# Patient Record
Sex: MALE | Race: WHITE
[De-identification: names, ages, dates, MRNs, and addresses within clinical notes are randomized per-mention and may not be internally consistent; named-entity substitution may affect disease eponyms.]

---

## 2022-04-11 ENCOUNTER — TRANSCRIPTION ENCOUNTER (OUTPATIENT)
Age: 27
End: 2022-04-11

## 2022-04-11 ENCOUNTER — APPOINTMENT (OUTPATIENT)
Dept: INTERNAL MEDICINE | Facility: CLINIC | Age: 27
End: 2022-04-11
Payer: COMMERCIAL

## 2022-04-11 VITALS
OXYGEN SATURATION: 98 % | DIASTOLIC BLOOD PRESSURE: 70 MMHG | SYSTOLIC BLOOD PRESSURE: 150 MMHG | HEIGHT: 71 IN | HEART RATE: 71 BPM | BODY MASS INDEX: 40.6 KG/M2 | WEIGHT: 290 LBS

## 2022-04-11 VITALS — DIASTOLIC BLOOD PRESSURE: 80 MMHG | SYSTOLIC BLOOD PRESSURE: 138 MMHG

## 2022-04-11 DIAGNOSIS — E66.9 OBESITY, UNSPECIFIED: ICD-10-CM

## 2022-04-11 DIAGNOSIS — Z00.00 ENCOUNTER FOR GENERAL ADULT MEDICAL EXAMINATION W/OUT ABNORMAL FINDINGS: ICD-10-CM

## 2022-04-11 DIAGNOSIS — S32.009A UNSPECIFIED FRACTURE OF UNSPECIFIED LUMBAR VERTEBRA, INITIAL ENCOUNTER FOR CLOSED FRACTURE: ICD-10-CM

## 2022-04-11 DIAGNOSIS — Z78.9 OTHER SPECIFIED HEALTH STATUS: ICD-10-CM

## 2022-04-11 DIAGNOSIS — Z82.49 FAMILY HISTORY OF ISCHEMIC HEART DISEASE AND OTHER DISEASES OF THE CIRCULATORY SYSTEM: ICD-10-CM

## 2022-04-11 DIAGNOSIS — R74.8 ABNORMAL LEVELS OF OTHER SERUM ENZYMES: ICD-10-CM

## 2022-04-11 PROCEDURE — 99385 PREV VISIT NEW AGE 18-39: CPT

## 2022-04-11 PROCEDURE — G0444 DEPRESSION SCREEN ANNUAL: CPT | Mod: 59

## 2022-04-11 PROCEDURE — G0442 ANNUAL ALCOHOL SCREEN 15 MIN: CPT

## 2022-04-11 NOTE — HISTORY OF PRESENT ILLNESS
[FreeTextEntry1] : Visit to establish care with new primary care doctor\par  [de-identified] : 26M presents for visit to establish care with new PMD. \par \par PMH: hx of vertebral fracture in high school - spondylolisthesis. did PT which improved pain. once in a while gets lower back pain and di PT again last month and was told everything was ok. Hx of elevated liver enzymes - had seen GI and was given a letter that he was cleared for work. Previously could not take accutane because of elevated liver enzymes. Pt cannot remember if he had US liver - might have done it. Fasting today. \par PSH: wisdom teeth\par FH: parents have HTN, no FH of colon cancer\par SH: see below\par \par Medications: none\par Allergies:none\par

## 2022-04-11 NOTE — HEALTH RISK ASSESSMENT
[Good] : ~his/her~  mood as  good [Never] : Never [Yes] : Yes [2 - 4 times a month (2 pts)] : 2-4 times a month (2 points) [1 or 2 (0 pts)] : 1 or 2 (0 points) [Never (0 pts)] : Never (0 points) [No] : In the past 12 months have you used drugs other than those required for medical reasons? No [0] : 2) Feeling down, depressed, or hopeless: Not at all (0) [PHQ-2 Negative - No further assessment needed] : PHQ-2 Negative - No further assessment needed [With Significant Other] : lives with significant other [] :  [Sexually Active] : sexually active [Feels Safe at Home] : Feels safe at home [Fully functional (bathing, dressing, toileting, transferring, walking, feeding)] : Fully functional (bathing, dressing, toileting, transferring, walking, feeding) [Fully functional (using the telephone, shopping, preparing meals, housekeeping, doing laundry, using] : Fully functional and needs no help or supervision to perform IADLs (using the telephone, shopping, preparing meals, housekeeping, doing laundry, using transportation, managing medications and managing finances) [Smoke Detector] : smoke detector [Carbon Monoxide Detector] : carbon monoxide detector [Seat Belt] :  uses seat belt [Sunscreen] : uses sunscreen [de-identified] : [employer] - combination cardio and bodyweight exercise. lost 50 lbs [Audit-CScore] : 2 [de-identified] : healthy diet now that he is working out - eats lots of vegetables.  [XXG2Ssest] : 0 [High Risk Behavior] : no high risk behavior [Reports changes in hearing] : Reports no changes in hearing [Reports changes in vision] : Reports no changes in vision [Guns at Home] : no guns at home [Reports changes in dental health] : Reports no changes in dental health [FreeTextEntry2] : travels a lot for work. works on [employer] [de-identified] : just went to dentist recently

## 2022-04-11 NOTE — ASSESSMENT
[FreeTextEntry1] : 26M presents for visit to establish care with new PMD. \par \par 1. HCM\par -hiv/hcv screening\par -tdap - will check records - had to get shots for work. \par -flu vaccine - declined\par -covid vaccine  - J&J and moderna booster. \par \par 2. Hx of liver enzyme elevated with neg w/u\par -does not remember having  US - consider completing if still elevated\par -pt fasting for labs today\par -discussed relationship between weight/fatty liver dz and pt already losing weight\par \par rtc pending labs\par \par BP elevated however pt had BP checked for employment physicals and life insurance with normal BP after some time - likely white coat HTN .

## 2022-04-11 NOTE — COUNSELING
[Good understanding] : Patient has a good understanding of disease, goals and obesity follow-up plan [FreeTextEntry2] : pt already losing weight with self made changes in diet/exercise

## 2022-04-12 ENCOUNTER — TRANSCRIPTION ENCOUNTER (OUTPATIENT)
Age: 27
End: 2022-04-12

## 2022-04-12 LAB
ALBUMIN SERPL ELPH-MCNC: 4.8 G/DL
ALP BLD-CCNC: 82 U/L
ALT SERPL-CCNC: 44 U/L
ANION GAP SERPL CALC-SCNC: 12 MMOL/L
AST SERPL-CCNC: 30 U/L
BASOPHILS # BLD AUTO: 0.05 K/UL
BASOPHILS NFR BLD AUTO: 0.8 %
BILIRUB SERPL-MCNC: 0.8 MG/DL
BUN SERPL-MCNC: 16 MG/DL
CALCIUM SERPL-MCNC: 9.7 MG/DL
CHLORIDE SERPL-SCNC: 104 MMOL/L
CHOLEST SERPL-MCNC: 177 MG/DL
CO2 SERPL-SCNC: 24 MMOL/L
CREAT SERPL-MCNC: 0.96 MG/DL
EGFR: 112 ML/MIN/1.73M2
EOSINOPHIL # BLD AUTO: 0.21 K/UL
EOSINOPHIL NFR BLD AUTO: 3.4 %
ESTIMATED AVERAGE GLUCOSE: 97 MG/DL
GLUCOSE SERPL-MCNC: 105 MG/DL
HBA1C MFR BLD HPLC: 5 %
HCT VFR BLD CALC: 47.3 %
HCV AB SER QL: NONREACTIVE
HCV S/CO RATIO: 0.08 S/CO
HDLC SERPL-MCNC: 40 MG/DL
HGB BLD-MCNC: 16 G/DL
HIV1+2 AB SPEC QL IA.RAPID: NONREACTIVE
IMM GRANULOCYTES NFR BLD AUTO: 0.6 %
LDLC SERPL CALC-MCNC: 113 MG/DL
LYMPHOCYTES # BLD AUTO: 1.68 K/UL
LYMPHOCYTES NFR BLD AUTO: 26.9 %
MAN DIFF?: NORMAL
MCHC RBC-ENTMCNC: 31.1 PG
MCHC RBC-ENTMCNC: 33.8 GM/DL
MCV RBC AUTO: 92 FL
MONOCYTES # BLD AUTO: 0.44 K/UL
MONOCYTES NFR BLD AUTO: 7 %
NEUTROPHILS # BLD AUTO: 3.83 K/UL
NEUTROPHILS NFR BLD AUTO: 61.3 %
NONHDLC SERPL-MCNC: 137 MG/DL
PLATELET # BLD AUTO: 205 K/UL
POTASSIUM SERPL-SCNC: 4.7 MMOL/L
PROT SERPL-MCNC: 7.1 G/DL
RBC # BLD: 5.14 M/UL
RBC # FLD: 12.9 %
SODIUM SERPL-SCNC: 141 MMOL/L
TRIGL SERPL-MCNC: 117 MG/DL
WBC # FLD AUTO: 6.25 K/UL

## 2022-06-01 ENCOUNTER — APPOINTMENT (OUTPATIENT)
Dept: INTERNAL MEDICINE | Facility: CLINIC | Age: 27
End: 2022-06-01
Payer: COMMERCIAL

## 2022-06-01 VITALS
HEIGHT: 71 IN | DIASTOLIC BLOOD PRESSURE: 70 MMHG | HEART RATE: 100 BPM | OXYGEN SATURATION: 98 % | BODY MASS INDEX: 40.6 KG/M2 | SYSTOLIC BLOOD PRESSURE: 130 MMHG | WEIGHT: 290 LBS

## 2022-06-01 DIAGNOSIS — L03.116 CELLULITIS OF LEFT LOWER LIMB: ICD-10-CM
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
## 2022-06-01 PROCEDURE — 99213 OFFICE O/P EST LOW 20 MIN: CPT

## 2022-06-01 NOTE — PHYSICAL EXAM
[Normal] : no acute distress, well nourished, well developed and well-appearing [No Respiratory Distress] : no respiratory distress  [de-identified] : b/l ankles without any swelling or erythema. there is some sunburn on left shin. i see healed bug bite on skin overlying left achilles tendon. no fluid collection. no bony tenderness

## 2022-06-01 NOTE — ASSESSMENT
[FreeTextEntry1] : 26M with class III obesity presents for acute visit to address bug bite\par \par -check xr and esr, low suspicion osteomyelitis\par -infection otherwise appearing to be healed.

## 2022-06-01 NOTE — HISTORY OF PRESENT ILLNESS
[FreeTextEntry8] : 26M with class III obesity presents for acute visit to address bug bite\par \par Had horsefly bite, urgent care virtual visit in [place]. Tx with clindamycin 300 mg TID x 7 days and prednisone 50 mg x 5 days. Swelling has resolved, redness resolved. He had some bruising where swelling was present, another  doctor told him that was normal. \par He has some tenderness where the tenderness started and he wanted to make sure that was normal. He still has a little bit of pain at the left ankle, not exactly in the bone but just under lateral malleolus.

## 2022-06-02 ENCOUNTER — TRANSCRIPTION ENCOUNTER (OUTPATIENT)
Age: 27
End: 2022-06-02

## 2022-06-02 ENCOUNTER — APPOINTMENT (OUTPATIENT)
Dept: RADIOLOGY | Facility: CLINIC | Age: 27
End: 2022-06-02
Payer: COMMERCIAL

## 2022-06-02 LAB
BASOPHILS # BLD AUTO: 0.07 K/UL
BASOPHILS NFR BLD AUTO: 0.5 %
EOSINOPHIL # BLD AUTO: 0.13 K/UL
EOSINOPHIL NFR BLD AUTO: 1 %
ERYTHROCYTE [SEDIMENTATION RATE] IN BLOOD BY WESTERGREN METHOD: < 2 MM/HR
HCT VFR BLD CALC: 49.3 %
HGB BLD-MCNC: 16.7 G/DL
IMM GRANULOCYTES NFR BLD AUTO: 0.9 %
LYMPHOCYTES # BLD AUTO: 2.31 K/UL
LYMPHOCYTES NFR BLD AUTO: 17.3 %
MAN DIFF?: NORMAL
MCHC RBC-ENTMCNC: 31.3 PG
MCHC RBC-ENTMCNC: 33.9 GM/DL
MCV RBC AUTO: 92.3 FL
MONOCYTES # BLD AUTO: 0.64 K/UL
MONOCYTES NFR BLD AUTO: 4.8 %
NEUTROPHILS # BLD AUTO: 10.08 K/UL
NEUTROPHILS NFR BLD AUTO: 75.5 %
PLATELET # BLD AUTO: 250 K/UL
RBC # BLD: 5.34 M/UL
RBC # FLD: 12.9 %
WBC # FLD AUTO: 13.35 K/UL

## 2022-06-02 PROCEDURE — 73600 X-RAY EXAM OF ANKLE: CPT | Mod: LT

## 2023-09-02 ENCOUNTER — APPOINTMENT (OUTPATIENT)
Dept: ORTHOPEDIC SURGERY | Facility: CLINIC | Age: 28
End: 2023-09-02
Payer: COMMERCIAL

## 2023-09-02 VITALS — HEIGHT: 71 IN | BODY MASS INDEX: 40.6 KG/M2 | WEIGHT: 290 LBS

## 2023-09-02 DIAGNOSIS — M77.51 OTHER ENTHESOPATHY OF RT FOOT AND ANKLE: ICD-10-CM

## 2023-09-02 PROCEDURE — L4361: CPT | Mod: LT,KX

## 2023-09-02 PROCEDURE — 73630 X-RAY EXAM OF FOOT: CPT | Mod: RT

## 2023-09-02 PROCEDURE — 99203 OFFICE O/P NEW LOW 30 MIN: CPT | Mod: 25

## 2023-09-02 RX ORDER — METHYLPREDNISOLONE 4 MG/1
4 TABLET ORAL
Qty: 1 | Refills: 0 | Status: ACTIVE | COMMUNITY
Start: 2023-09-02 | End: 1900-01-01

## 2023-09-02 NOTE — HISTORY OF PRESENT ILLNESS
[Sudden] : sudden [9] : 9 [1] : 2 [Intermittent] : intermittent [Localized] : localized [Household chores] : household chores [Ice] : ice [Walking] : walking [de-identified] : 26 y/o M with atraumatic R foot pain x 3 days. Pt states he went for a long walk wearing sandals. Pt works out and runs. denies numbness/tingling. [] : no [FreeTextEntry1] : right foot  [FreeTextEntry5] : no injury  [FreeTextEntry9] : tylenol  [de-identified] : nothing

## 2023-09-02 NOTE — ASSESSMENT
[FreeTextEntry1] : A/P R foot tendonitis - WBAT - cam walker applied to patient  - MDP - f/u foot/ankle 1-2 weeks

## 2023-09-02 NOTE — IMAGING
[de-identified] : PE R foot: +swelling, +tenderness over plantar aspect of midfoot, no tenderness at lisfranc area, to tenderness at MTP heads, no tendneress over plantar fascitis, no tenderness at ankle, EHL/FHL/ADF/APF intact, sensory intact, calves soft, NT [Right] : right foot [There are no fractures, subluxations or dislocations. No significant abnormalities are seen] : There are no fractures, subluxations or dislocations. No significant abnormalities are seen

## 2023-09-11 ENCOUNTER — APPOINTMENT (OUTPATIENT)
Dept: ORTHOPEDIC SURGERY | Facility: CLINIC | Age: 28
End: 2023-09-11
Payer: COMMERCIAL

## 2023-09-11 VITALS — BODY MASS INDEX: 40.6 KG/M2 | HEIGHT: 71 IN | WEIGHT: 290 LBS

## 2023-09-11 DIAGNOSIS — M77.41 METATARSALGIA, RIGHT FOOT: ICD-10-CM

## 2023-09-11 PROCEDURE — 99203 OFFICE O/P NEW LOW 30 MIN: CPT

## 2023-09-11 PROCEDURE — 99213 OFFICE O/P EST LOW 20 MIN: CPT

## 2024-05-23 ENCOUNTER — APPOINTMENT (OUTPATIENT)
Dept: ORTHOPEDIC SURGERY | Facility: CLINIC | Age: 29
End: 2024-05-23
Payer: COMMERCIAL

## 2024-05-23 VITALS — WEIGHT: 225 LBS | BODY MASS INDEX: 31.5 KG/M2 | HEIGHT: 71 IN

## 2024-05-23 PROCEDURE — 73564 X-RAY EXAM KNEE 4 OR MORE: CPT | Mod: RT

## 2024-05-23 PROCEDURE — 99213 OFFICE O/P EST LOW 20 MIN: CPT

## 2024-05-23 NOTE — IMAGING
[Right] : right knee [AP] : anteroposterior [Lateral] : lateral [[place]] : skyline [AP Standing] : anteroposterior standing [There are no fractures, subluxations or dislocations. No significant abnormalities are seen] : There are no fractures, subluxations or dislocations. No significant abnormalities are seen

## 2024-05-23 NOTE — HISTORY OF PRESENT ILLNESS
[7] : 7 [Dull/Aching] : dull/aching [Sharp] : sharp [Full time] : Work status: full time [de-identified] : 5/23/24:  This is a 28M with right knee pain since early may 2024 with possible exacerbation due to running.  Pain worse after he ran the [employer] on 5/18/24.  Pain is intermittent but mostly on the outside of the knee.  Symptoms worse with deep bending, stairs.  Reports to tightness and stiffness at times.  No clicking or buckling of the knee.  [] : no [FreeTextEntry1] :  RT knee  [FreeTextEntry5] : 05/23/2024: 28-year-old male presenting with RT knee pain. No known injury/fall. Ran [employer] [place] on 5/18/24- experiencing sharp, dull/aching pain since. No prior treatment. Applied heat with some relief. Most pain with stairs.  [de-identified] : [employer]- [employer]

## 2024-05-23 NOTE — PHYSICAL EXAM
[Right] : right knee [NL (0)] : extension 0 degrees [5___] : hamstring 5[unfilled]/5 [] : non-antalgic [FreeTextEntry8] : ttp distal IT Band and gerdy's tubercle [de-identified] : tight IT band [TWNoteComboBox7] : flexion 135 degrees

## 2024-05-23 NOTE — DISCUSSION/SUMMARY
[de-identified] : 28m with R lateral knee pain with likely IT band tendinitis 1)  Rest, ice, activity modification 2)  Nsaids prn:  gi precautions reviewed 3)  Cryotherapy  4)  Start PT 5)  RTC in 4-6 weeks if in pain  Patient seen by Dr. Robert Johnson. Patient seen by Maria Luisa SPARKS under the supervision of Dr. Robert Johnson. Progress note completed by Maria Luisa SPARKS.

## 2024-06-18 ENCOUNTER — APPOINTMENT (OUTPATIENT)
Dept: ORTHOPEDIC SURGERY | Facility: CLINIC | Age: 29
End: 2024-06-18
Payer: COMMERCIAL

## 2024-06-18 VITALS — HEIGHT: 71 IN | BODY MASS INDEX: 31.5 KG/M2 | WEIGHT: 225 LBS

## 2024-06-18 DIAGNOSIS — M23.91 UNSPECIFIED INTERNAL DERANGEMENT OF RIGHT KNEE: ICD-10-CM

## 2024-06-18 DIAGNOSIS — M76.31 ILIOTIBIAL BAND SYNDROME, RIGHT LEG: ICD-10-CM

## 2024-06-18 PROCEDURE — 99213 OFFICE O/P EST LOW 20 MIN: CPT

## 2024-06-18 NOTE — IMAGING
[Right] : right knee [AP] : anteroposterior [Lateral] : lateral [[place]] : skyline [AP Standing] : anteroposterior standing [There are no fractures, subluxations or dislocations. No significant abnormalities are seen] : There are no fractures, subluxations or dislocations. No significant abnormalities are seen

## 2024-06-18 NOTE — HISTORY OF PRESENT ILLNESS
[7] : 7 [Dull/Aching] : dull/aching [Sharp] : sharp [Full time] : Work status: full time [de-identified] : 6/18/24: Here to f/up right knee. Attending PT. Reports that he has persistent lateral pain, continues to come on gradually with his exercises or running.  5/23/24:  This is a 28M with right knee pain since early may 2024 with possible exacerbation due to running.  Pain worse after he ran the [employer] on 5/18/24.  Pain is intermittent but mostly on the outside of the knee.  Symptoms worse with deep bending, stairs.  Reports to tightness and stiffness at times.  No clicking or buckling of the knee.  [] : no [FreeTextEntry1] :  RT knee  [FreeTextEntry5] : has started PT-going well, noticing slight improvement. Still experiencing pain when running.  [de-identified] : [employer]- [employer]

## 2024-06-18 NOTE — PHYSICAL EXAM
[Right] : right knee [NL (0)] : extension 0 degrees [5___] : hamstring 5[unfilled]/5 [Equivocal] : equivocal [place] [] : non-antalgic [FreeTextEntry8] : ttp distal IT Band and gerdy's tubercle [de-identified] : tight IT band [TWNoteComboBox7] : flexion 135 degrees

## 2024-06-18 NOTE — DISCUSSION/SUMMARY
[de-identified] : 28m with lateral right knee pain. Persistent pain, tightness, has failed conservative treatment.  1) MRI right knee, eval meniscal tear.  2) cryotherapy, rest and activity modification  3) rtc after MRI   Entered by Gabi Woods acting as scribe. Dr. Johnson- The documentation recorded by the scribe accurately reflects the service I personally performed and the decisions made by me.

## 2024-06-21 ENCOUNTER — APPOINTMENT (OUTPATIENT)
Dept: MRI IMAGING | Facility: CLINIC | Age: 29
End: 2024-06-21
Payer: COMMERCIAL

## 2024-06-21 PROCEDURE — 73721 MRI JNT OF LWR EXTRE W/O DYE: CPT | Mod: RT

## 2024-07-03 ENCOUNTER — APPOINTMENT (OUTPATIENT)
Dept: ORTHOPEDIC SURGERY | Facility: CLINIC | Age: 29
End: 2024-07-03
Payer: COMMERCIAL

## 2024-07-03 DIAGNOSIS — M76.31 ILIOTIBIAL BAND SYNDROME, RIGHT LEG: ICD-10-CM

## 2024-07-03 PROCEDURE — 99214 OFFICE O/P EST MOD 30 MIN: CPT

## 2024-07-03 RX ORDER — MELOXICAM 15 MG/1
15 TABLET ORAL
Qty: 14 | Refills: 0 | Status: ACTIVE | COMMUNITY
Start: 2024-07-03 | End: 1900-01-01